# Patient Record
(demographics unavailable — no encounter records)

---

## 2024-12-20 NOTE — DISCUSSION/SUMMARY
[EKG obtained to assist in diagnosis and management of assessed problem(s)] : EKG obtained to assist in diagnosis and management of assessed problem(s) [FreeTextEntry1] : EKG performed today was unremarkable.  Mild CAD: The impression is mild coronary artery disease. LDL is currently at goal. There are no changes in medication management. Continue current medical therapy. Other planned treatment includes dietary modification, an exercise regimen, and weight reduction.  HTN: The impression is hypertension. Currently, the condition is controlled. There are no changes in medication management. Continue current medical therapy. Other planned treatments include an exercise regimen, weight loss, low sodium diet, and alcohol moderation.  HLD: The impression is hyperlipidemia. There are no changes in medication management. Continue current medical therapy. Other planned treatment includes diet modification, exercise, and weight loss.  Obesity: Discussed risks of obesity with the patient. Counselled on weight loss with dietary modification and increased physical activity/exercise. Recommend consultation with Jacki Mcgregor DNP, for weight loss.   Instructed to follow up in 6 months.  Plan was discussed with the patient.

## 2024-12-20 NOTE — CARDIOLOGY SUMMARY
[de-identified] : 12/19/2024: NSR, normal axis, normal intervals, (-) ST-T wave changes. =======================================================

## 2024-12-20 NOTE — CARDIOLOGY SUMMARY
[de-identified] : 12/19/2024: NSR, normal axis, normal intervals, (-) ST-T wave changes. =======================================================

## 2024-12-20 NOTE — HISTORY OF PRESENT ILLNESS
[FreeTextEntry1] : ZACHARIAH GTZ is a 55-year-old male, with a PMHx significant for mild CAD, HTN, and HLD, who presents today for a follow-up visit. Denies any new complaints. Reports he is feeling well. LDL is currently at goal. Otherwise: (-) chest pain, (-) SOB.  Social History: (-) Smoking, (+) EtOH, (-) Illicit drug use.

## 2025-01-02 NOTE — HISTORY OF PRESENT ILLNESS
[FreeTextEntry1] : ZACHARIAH GTZ is a 55-year-old male, with a PMHx significant for mild CAD, HTN, and HLD, FARHAD on CPAP, fatty liver Social History: (-) Smoking, (+) EtOH, (-) Illicit drug use.  try zepbound  Pt presents for risk factor management. always been overweight, but last 8 months very dramatic weight gain not sure why possibly due to stress job changed only successful diet for him was keto but was told by pcp doc not to  2 yrs ago did keto and got down to 310 but then gained it back and more BP at home 115/70.   if he eats sweets eats a lot of it sedentary works at an office, teaches, works on the [employer],

## 2025-06-03 NOTE — REVIEW OF SYSTEMS
[Fever] : no fever [Chills] : no chills [Blurry Vision] : no blurred vision [SOB] : no shortness of breath [Chest Discomfort] : no chest discomfort [Lower Ext Edema] : no extremity edema [Palpitations] : no palpitations [Syncope] : no syncope [Cough] : no cough [Wheezing] : no wheezing [Abdominal Pain] : no abdominal pain [Nausea] : no nausea [Dizziness] : no dizziness [Confusion] : no confusion was observed

## 2025-06-03 NOTE — REVIEW OF SYSTEMS
Patient Call: Voicemail  Date/Time: 8/3/20 / 4:54 pm  Reason for call: Patient is due for her 1 year lab follow up and wondering if she can do rest of labs (could translate location) because her clinic does not have that test tube. Please call her back: [phone]   [Fever] : no fever [Chills] : no chills [Blurry Vision] : no blurred vision [SOB] : no shortness of breath [Chest Discomfort] : no chest discomfort [Lower Ext Edema] : no extremity edema [Palpitations] : no palpitations [Syncope] : no syncope [Cough] : no cough [Wheezing] : no wheezing [Abdominal Pain] : no abdominal pain [Nausea] : no nausea [Dizziness] : no dizziness [Confusion] : no confusion was observed

## 2025-06-03 NOTE — ASSESSMENT
[FreeTextEntry1] : #Mild CAD #HTN controlled at home  #HLD not at goal  #FARHAD on CPAP #Morbid obesity- BMI 50-->44  Plan: -Continue Zepbound 12.5mg subcutaneously weekly.   - Dietary changes and exercise regimen discussed at length - Encouraged plant-based and Mediterranean diets, along with increased fruit, nut, vegetable, legume, and lean vegetable or animal protein (preferably fish) consumption  -Engage in at least 150 minutes per week of accumulated moderate-intensity aerobic physical activity or 75 minutes per week of vigorous-intensity aerobic physical activity - Follow up 1 month

## 2025-06-03 NOTE — HISTORY OF PRESENT ILLNESS
[FreeTextEntry1] : ZACHARIAH GTZ is a 55-year-old male, with a PMHx significant for mild CAD, HTN, HLD, FARHAD on CPAP, fatty liver  Cardiologist: Dr. Francis  Pt presents for risk factor management follow-up.  BP at home 115/70.   wt at home 339    A1c 5.4

## 2025-06-25 NOTE — CARDIOLOGY SUMMARY
[de-identified] : 06/25/2025: NSR, normal axis, normal intervals, (-) ST-T wave changes. 12/19/2024: NSR, normal axis, normal intervals, (-) ST-T wave changes. =======================================================

## 2025-06-25 NOTE — DISCUSSION/SUMMARY
[EKG obtained to assist in diagnosis and management of assessed problem(s)] : EKG obtained to assist in diagnosis and management of assessed problem(s) [FreeTextEntry1] : EKG performed today was unremarkable.  Mild CAD: LDL is currently at goal. There are no changes in medication management. Continue current medical therapy. Other planned treatment includes dietary modification, an exercise regimen, and weight reduction.  HTN: Currently, the condition is controlled. There are no changes in medication management. Continue current medical therapy. Other planned treatments include an exercise regimen, weight loss, low sodium diet, and alcohol moderation.  HLD: LDL is 102. There are no changes in medication management. Continue current medical therapy. Other planned treatment includes diet modification, exercise, and weight loss.  Obesity: Discussed risks of obesity with the patient. Counselled on weight loss with dietary modification and increased physical activity/exercise. Recommend consultation with Jacki Mcgregor DNP, for weight loss.   Instructed to follow up in 6 months.  Plan was discussed with the patient. No

## 2025-06-25 NOTE — DISCUSSION/SUMMARY
[EKG obtained to assist in diagnosis and management of assessed problem(s)] : EKG obtained to assist in diagnosis and management of assessed problem(s) [FreeTextEntry1] : EKG performed today was unremarkable.  Mild CAD: LDL is currently at goal. There are no changes in medication management. Continue current medical therapy. Other planned treatment includes dietary modification, an exercise regimen, and weight reduction.  HTN: Currently, the condition is controlled. There are no changes in medication management. Continue current medical therapy. Other planned treatments include an exercise regimen, weight loss, low sodium diet, and alcohol moderation.  HLD: LDL is 102. There are no changes in medication management. Continue current medical therapy. Other planned treatment includes diet modification, exercise, and weight loss.  Obesity: Discussed risks of obesity with the patient. Counselled on weight loss with dietary modification and increased physical activity/exercise. Recommend consultation with Jacki Mcgregor DNP, for weight loss.   Instructed to follow up in 6 months.  Plan was discussed with the patient.

## 2025-06-25 NOTE — CARDIOLOGY SUMMARY
[de-identified] : 06/25/2025: NSR, normal axis, normal intervals, (-) ST-T wave changes. 12/19/2024: NSR, normal axis, normal intervals, (-) ST-T wave changes. =======================================================

## 2025-06-25 NOTE — HISTORY OF PRESENT ILLNESS
[FreeTextEntry1] : ZACHARIAH GTZ is a 55-year-old male, with a PMHx significant for mild CAD, HTN, and HLD, who presents today for a follow-up visit. Denies any new complaints. Reports he is feeling well. Patient's weight is now 327lbs; down from 394lbs while on Mounjaro. He is being followed by Jacki Mcgregor. LDL is 102. Otherwise: (-) chest pain, (-) SOB.  Social History: (-) Smoking, (+) EtOH, (-) Illicit drug use.